# Patient Record
Sex: MALE | Race: WHITE
[De-identification: names, ages, dates, MRNs, and addresses within clinical notes are randomized per-mention and may not be internally consistent; named-entity substitution may affect disease eponyms.]

---

## 2021-03-10 ENCOUNTER — HOSPITAL ENCOUNTER (OUTPATIENT)
Dept: HOSPITAL 33 - SDC-PAIN | Age: 36
Discharge: HOME | End: 2021-03-10
Attending: PSYCHIATRY & NEUROLOGY
Payer: COMMERCIAL

## 2021-03-10 DIAGNOSIS — Z79.899: ICD-10-CM

## 2021-03-10 DIAGNOSIS — M54.16: Primary | ICD-10-CM

## 2021-03-10 DIAGNOSIS — M19.90: ICD-10-CM

## 2021-03-10 PROCEDURE — 62323 NJX INTERLAMINAR LMBR/SAC: CPT

## 2021-03-10 PROCEDURE — 77003 FLUOROGUIDE FOR SPINE INJECT: CPT

## 2021-03-10 PROCEDURE — 72100 X-RAY EXAM L-S SPINE 2/3 VWS: CPT

## 2021-03-10 NOTE — XRAY
Indication: Lumbar ULYSSES.



Intraoperative fluoroscopy provided for 20 seconds.  2 digital spot images

submitted for interpretation demonstrates midline posterior needle tip

projecting just posterior to the L3-L4 interspace.  Small amount of contrast

injected for needle tip placement.  Correlate with intraoperative

findings/report.  Incidental L5-S1 fusion surgery with partially visualized

bilateral fusion hardware and intervertebral spacer.